# Patient Record
Sex: FEMALE | Race: BLACK OR AFRICAN AMERICAN | NOT HISPANIC OR LATINO | URBAN - METROPOLITAN AREA
[De-identification: names, ages, dates, MRNs, and addresses within clinical notes are randomized per-mention and may not be internally consistent; named-entity substitution may affect disease eponyms.]

---

## 2023-06-25 ENCOUNTER — EMERGENCY (EMERGENCY)
Facility: HOSPITAL | Age: 28
LOS: 1 days | Discharge: HOME | End: 2023-06-25
Admitting: EMERGENCY MEDICINE
Payer: COMMERCIAL

## 2023-06-25 VITALS
TEMPERATURE: 98 F | SYSTOLIC BLOOD PRESSURE: 115 MMHG | HEART RATE: 105 BPM | RESPIRATION RATE: 18 BRPM | DIASTOLIC BLOOD PRESSURE: 76 MMHG | OXYGEN SATURATION: 100 %

## 2023-06-25 PROCEDURE — 99284 EMERGENCY DEPT VISIT MOD MDM: CPT

## 2023-06-25 NOTE — ED ADULT TRIAGE NOTE - CHIEF COMPLAINT QUOTE
c/o of intermittent hive rash x4 years, this instance for the past month. Rash is burning and itchy all over her body. No facial or lip swelling noted at this time but has in the past. respirations even and nonlabored.

## 2023-06-26 VITALS
SYSTOLIC BLOOD PRESSURE: 124 MMHG | OXYGEN SATURATION: 100 % | DIASTOLIC BLOOD PRESSURE: 74 MMHG | TEMPERATURE: 97 F | RESPIRATION RATE: 18 BRPM | HEART RATE: 94 BPM

## 2023-06-26 RX ORDER — FAMOTIDINE 10 MG/ML
20 INJECTION INTRAVENOUS ONCE
Refills: 0 | Status: COMPLETED | OUTPATIENT
Start: 2023-06-26 | End: 2023-06-26

## 2023-06-26 RX ORDER — DIPHENHYDRAMINE HCL 50 MG
25 CAPSULE ORAL ONCE
Refills: 0 | Status: COMPLETED | OUTPATIENT
Start: 2023-06-26 | End: 2023-06-26

## 2023-06-26 RX ORDER — FAMOTIDINE 10 MG/ML
1 INJECTION INTRAVENOUS
Qty: 3 | Refills: 0
Start: 2023-06-26 | End: 2023-06-28

## 2023-06-26 RX ADMIN — FAMOTIDINE 20 MILLIGRAM(S): 10 INJECTION INTRAVENOUS at 01:41

## 2023-06-26 RX ADMIN — Medication 50 MILLIGRAM(S): at 01:40

## 2023-06-26 RX ADMIN — Medication 25 MILLIGRAM(S): at 01:40

## 2023-06-26 NOTE — ED PROVIDER NOTE - NSFOLLOWUPINSTRUCTIONS_ED_ALL_ED_FT
Follow up with your PMD within 48-72 hours.  Show copies of your labs provided.  Recommend consult with an Allergist. Referral list provided.  Take Prednisone 40mg daily for 4 days, Pepcid 20mg 2x/day for 4 days, Benadryl 25mg every 8 hours for 4 days- caution drowsiness/do not drive. Worsening or new shortness of breath, tightness in your throat, swelling, chest pain, fever, chills, or ANY new concerning symptoms return to the Emergency Department.

## 2023-06-26 NOTE — ED PROVIDER NOTE - CLINICAL SUMMARY MEDICAL DECISION MAKING FREE TEXT BOX
28 yo F with PMH of body rash, presents to ED c/o itchy hives over body x2 weeks. well appearing female. afebrile. no respiratory distress. Impression: allergic reaction. Unknown source from history. pt tested positive for shrimp, dust mites, cockroaches. Plan: steroids, benadryl, pepcid, and allergist referral.

## 2023-06-26 NOTE — ED PROVIDER NOTE - OBJECTIVE STATEMENT
26 yo F with PMH of body rash, presents to ED c/o itchy hives over body x2 weeks. Reports rash started in both arms and legs, then body. Admits taking Zyrtec  w/o much improvement. Reports similar episodes in the past, tested positive for shrimp, dust mites and cockroaches, but she eats shrimp w/o problem in the past. Admits she seen dermatology in the past but every time she goes, her rash already resolved. Denies any fever, chills, facial swelling, throat itchiness, sob, chest pain, body aches, cough, antibiotic use, new personal products, sick contacts, recent travel, or any other complaints.

## 2023-06-26 NOTE — ED ADULT NURSE NOTE - OBJECTIVE STATEMENT
Patient in intake. A&O4. Ambulatory. Came into ED with complaints of hives for the past 2 weeks. States having intermittent hives before and had to take prednisone for it for the past 4 years. Rash is present on arms and face. Hives appear itchy and red on skin. Respirations even and unlabored. Denies SOB, Chest pain, n/v/d. Airway patent and intact. No swelling noted. Medicated per MD Orders.

## 2023-06-26 NOTE — ED PROVIDER NOTE - NS_EDPROVIDERDISPOUSERTYPE_ED_A_ED
Pt here after being at the urgent care. Pt was told to come to ER to get ABD imaging to rule out appendicitis.
I have personally evaluated and examined the patient. The Attending was available to me as a supervising provider if needed.

## 2023-06-26 NOTE — ED ADULT NURSE NOTE - NSFALLUNIVINTERV_ED_ALL_ED
Bed/Stretcher in lowest position, wheels locked, appropriate side rails in place/Call bell, personal items and telephone in reach/Instruct patient to call for assistance before getting out of bed/chair/stretcher/Non-slip footwear applied when patient is off stretcher/[place] to call system/Physically safe environment - no spills, clutter or unnecessary equipment/Purposeful proactive rounding/Room/bathroom lighting operational, light cord in reach

## 2023-06-26 NOTE — ED PROVIDER NOTE - SKIN TEMP
Patient is scheduled for lab on 8/24/18.  Patient has been displaced from [facility], so new orders need to be placed in the system.  Thank you.   warm

## 2023-06-26 NOTE — ED PROVIDER NOTE - PATIENT PORTAL LINK FT
You can access the FollowMyHealth Patient Portal offered by [facility] by registering at the following website: [email or web address]. By joining [facility]’s FollowMyHealth portal, you will also be able to view your health information using other applications (apps) compatible with our system.

## 2024-11-15 NOTE — ED ADULT TRIAGE NOTE - SPO2 (%)
CERTIFICATE OF WORK       November 15, 2024      Re: Dilia Steele  [address] [facility] [id number]      This is to certify that Dilia Steele has been under my care from 11/15/2024 and can return to regular work on Monday, November 18th.     RESTRICTIONS: None                  SIGNATURE:___________________________________________          Kirill Ng MD  [facility]  [address] [facility] [address]  Phone: [phone]  
[phone]